# Patient Record
Sex: FEMALE | Race: WHITE
[De-identification: names, ages, dates, MRNs, and addresses within clinical notes are randomized per-mention and may not be internally consistent; named-entity substitution may affect disease eponyms.]

---

## 2018-08-27 ENCOUNTER — HOSPITAL ENCOUNTER (INPATIENT)
Dept: HOSPITAL 36 - GERO2 | Age: 58
LOS: 16 days | Discharge: NURSING, REHAB OR LONG-TERM CARE | DRG: 885 | End: 2018-09-12
Attending: PSYCHIATRY & NEUROLOGY | Admitting: PSYCHIATRY & NEUROLOGY
Payer: MEDICARE

## 2018-08-27 VITALS — DIASTOLIC BLOOD PRESSURE: 72 MMHG | SYSTOLIC BLOOD PRESSURE: 111 MMHG

## 2018-08-27 DIAGNOSIS — R31.29: ICD-10-CM

## 2018-08-27 DIAGNOSIS — E87.6: ICD-10-CM

## 2018-08-27 DIAGNOSIS — Z87.891: ICD-10-CM

## 2018-08-27 DIAGNOSIS — R73.9: ICD-10-CM

## 2018-08-27 DIAGNOSIS — F20.9: Primary | ICD-10-CM

## 2018-08-27 LAB
ALBUMIN SERPL-MCNC: 3.6 GM/DL (ref 3.7–5.3)
ALBUMIN/GLOB SERPL: 0.9 {RATIO} (ref 1–1.8)
ALP SERPL-CCNC: 83 U/L (ref 34–104)
ALT SERPL-CCNC: 11 U/L (ref 7–52)
ANION GAP SERPL CALC-SCNC: 11 MMOL/L (ref 7–16)
AST SERPL-CCNC: 17 U/L (ref 13–39)
BILIRUB SERPL-MCNC: 0.3 MG/DL (ref 0.3–1)
BUN SERPL-MCNC: 29 MG/DL (ref 7–25)
CALCIUM SERPL-MCNC: 9.5 MG/DL (ref 8.6–10.3)
CHLORIDE SERPL-SCNC: 104 MEQ/L (ref 98–107)
CO2 SERPL-SCNC: 25.5 MEQ/L (ref 21–31)
CREAT SERPL-MCNC: 1.4 MG/DL (ref 0.6–1.2)
GLOBULIN SER-MCNC: 3.9 GM/DL
GLUCOSE SERPL-MCNC: 116 MG/DL (ref 70–105)
POTASSIUM SERPL-SCNC: 3.5 MEQ/L (ref 3.5–5.1)
SODIUM SERPL-SCNC: 137 MEQ/L (ref 136–145)

## 2018-08-27 PROCEDURE — Z7610: HCPCS

## 2018-08-27 PROCEDURE — G0410 GRP PSYCH PARTIAL HOSP 45-50: HCPCS

## 2018-08-27 NOTE — PSYCHIATRIC EVALUATION
DATE OF SERVICE:  08/27/2018



The patient is currently in the hospital on a hold written on 08/25/2018 for

danger to others, gravely disabled.  The patient was disorganized, tangential,

yelling and attempting to strike staff, agitated.  Does not know where she is,

states that her son is the  for [employer] and that she is

"traveling" and "wandering around."



CHIEF COMPLAINT:  "What he wants."



HISTORY OF PRESENT ILLNESS:  A 57-year-old female, disoriented, confused,

rambling nonsensically, placed on a hold for grave disability, danger to others,

confused, disoriented, striking out behaviors, combative behaviors.  States she

is in the hospital to "get her meals."  States, she is just passing through,

going from one hospital to the next.  The patient was uncooperative on initial

exam, states that she can refuse medications due to the LPS act.  States that

her son works in [place] for president [employer].



PAST PSYCHIATRIC HISTORY:  Hospitalizations in the past.



SOCIAL HISTORY:  "I do not know where I was born."  States she has a son who

is 35 who works for the president, refusing to tell me where she lives.  Unclear

drugs, alcohol or tobacco.



MEDICATIONS:  Noted.



MEDICAL HISTORY:  Please see full H and P.



MENTAL STATUS EXAMINATION:  Stated age.  Fair eye contact.  Speech:  Mumbling to

self.  Mood "okay."  Affect flat.  Thought processes, she seemed to be

disorganized, responding to internal stimuli.  No SI, no HI.  Insight and

judgment diminished.



PROVISIONAL DIAGNOSES:  Schizophrenia, poor medication and treatment compliance.



MEDICAL:  Please see full H and P.



ESTIMATED LENGTH OF STAY:  7-10 days.  Pain is 8/10.



ASSESSMENT:  The patient is disorganized, psychotic appearing, in on a hold.



PLAN:  Start Risperdal.



TREATMENT PLAN:  Includes group as well as milieu therapy.



CONDITIONS FOR DISCHARGE:  Improved mood, improved affect, better control of her

psychotic symptoms.





DD: 08/27/2018 14:56

DT: 08/27/2018 16:27

JOB# [id number]  [id number]

## 2018-08-27 NOTE — HISTORY & PHYSICAL
ADMIT DATE:  08/27/2018



MEDICAL HISTORY AND PHYSICAL



PATIENT'S IDENTIFICATION:  A 57-year-old female.



REQUESTING PHYSICIAN:  Dr. Lira.



CHIEF COMPLAINT:  "I am fine."



HISTORY OF PRESENT ILLNESS:  A 57-year-old  female presented to [facility] by police for psychiatric evaluation, when the patient was

noted to have a screaming and yelling at staff and police.  The patient was

placed on hold and now has been transferred to Geropsych Unit here at [facility].



PAST MEDICAL HISTORY:  Negative for diabetes, hypertension, hyperlipidemia,

kidney disease, or liver disease.



MEDICATIONS:  Currently medication list has been reviewed.



ALLERGIES:  The patient is not allergic to medications.



SOCIAL HISTORY:  She lives with a roommate.  She has a history of smoking

cigarette.  No alcohol or drug use.



FAMILY MEDICAL HISTORY:  Negative for diabetes, hypertension, kidney disease, or

liver disease.



PAST SURGICAL HISTORY:  The patient refused to say.



MENSTRUAL AND GYNECOLOGIC HISTORY:  The patient is menopausal.



REVIEW OF SYSTEMS:  The patient currently denies any headache, blurred vision,

double vision, dysphagia, odynophagia, runny nose, stuffy nose, fever, chills,

cough, chest pain, shortness of breath, palpitation, dizziness, nausea,

vomiting, diarrhea, dysuria, hematuria, hematochezia, melena.  No history of any

seizure or syncopal episode.  No weight loss or weight gain.



PHYSICAL EXAMINATION:

GENERAL:  The patient is alert, awake, oriented, lying in the bed without any

acute distress.

VITAL SIGNS:  Temperature 98.6, pulse is 74, respiratory rate 18, blood pressure

122/84.

SKIN:  Warm to touch.  Adequate skin turgor.  No petechia, no purpura.

HEENT:  Normocephalic, atraumatic.  Extraocular muscles are intact.  Tongue more

pink and coated.  Poor dentition noted.  No oral lesion.  No bleed, no sinus

tenderness.  External auditory canal and tympanic membranes are well visualized.

NECK:  Supple, no JVD, no hepatojugular reflex.  No lymphadenopathy, thyromegaly

or carotid bruit.

HEART:  Both heart sounds are regular.  No S3, no S4, no murmur.

CHEST AND LUNGS:  Equal in expansion, no expiratory wheezing.

ABDOMEN:  Soft.  No guarding, no rigidity.  Liver, spleen palpable.  No palpable

mass.

EXTREMITIES:  No edema, no cyanosis or clubbing.  Peripheral pulses +2.  No calf

tenderness noted.

EXTERNAL GENITALIA, RECTAL EXAMINATION, BREASTS EXAMINATION:  Not done at the

patient request.

NEUROLOGIC:  Alert, awake, oriented to time, place, person.  2-12 cranial nerves

are intact.  Power in upper or lower extremities is 5+.  Sensation to touch are

intact.  Babinskis in both toes are going down.  No cerebellar sign.



AVAILABLE DIAGNOSTIC DATA:  White count of 9.2, hemoglobin 14.7, platelet count

of 228.  Sodium 132, potassium 3.2, chloride 102, CO2 of 29, BUN and creatinine

is 8 and 0.7.  Liver functions are normal.  Urine drug screen was negative. 

Urinalysis was unremarkable.



CLINICAL IMPRESSION:

1.  Psychiatric disorder exacerbation.

2.  Hypokalemia.

3.  Microscopic hematuria.



PLAN:

1.  Recheck BMP.

2.  Recheck urinalysis.

3.  Psychiatric evaluation and management deferred to psychiatrist.

4.  The patient is medically stable to participate in active Geropsych Unit.

5.  The patient has been advised to see her primary care MD upon discharge for

followup.

6.  Care approved.

7.  Age appropriate health maintenance has been discussed.

8.  I sincerely thank you, Dr. Lira, for giving me the opportunity to

participate the patient of yours.





DD: 08/27/2018 09:43

DT: 08/27/2018 10:39

[facility]# [id number]  [id number]

## 2018-08-28 NOTE — PROGRESS NOTES
DATE:  



IDENTIFICATION:  A 57-year-old female.



SUBJECTIVE:  The patient seen and examined.  The patient is walking in the

hallway.  The patient remained agitated and confused.  Upon questioning, the

patient denies any chest pain, short of breath, palpitation, dizziness, nausea,

or vomiting.



OBJECTIVE:

VITAL SIGNS:  Temperature 97.7, pulse 80, respiratory rate 20, and blood

pressure 100/56.

HEENT:  No facial asymmetry.

NECK:  Supple, no JVD.

HEART:  Regular.

CHEST:  Lung equal in expansion, no expiratory wheezing.

ABDOMEN:  Soft.  No guarding, no rigidity.  Bowel sounds present.  No palpable

mass.

EXTREMITIES:  No edema.



AVAILABLE DIAGNOSTIC DATA:  BUN and creatinine are 29 and 1.4, potassium 3.5,

glucose 116, albumin 3.6.



CLINICAL IMPRESSION:

1.  Elevated BUN and creatinine, probably prerenal azotemia.  Cannot rule out

other etiology of chronic kidney disease.

2.  Mild hyperglycemia.

3.  Psychotic disorder.



PLAN:

1.  Encouraged to drink plenty of fluids orally.

2.  Recheck BMP, magnesium, and also check glycohemoglobin A1c.

3.  Psych medication and follow up as per psychiatrist.  Care plan reviewed.





DD: 08/28/2018 09:02

DT: 08/28/2018 09:37

JOB# [id number]  [id number]

## 2018-08-29 NOTE — PROGRESS NOTES
DATE:  08/28/2018



SUBJECTIVE:  The patient stuffing linens down the toilet, bizarre, symptomatic,

psychotic behaviors, talking about Satan, mumbling to self, very disheveled,

disorganized, psychotic appearing, gravely disabled, not making any sense, keeps

mentioning the LPS act.



ASSESSMENT:  The patient is bizarre, stuffing linens down the toilet.  The

patient requiring emergency medication x 1 now, Haldol cocktail.  



PLAN:  We will continue to monitor and initiate a 14-day hold.





DD: 08/28/2018 20:50

DT: 08/29/2018 01:21

JOB# [id number]  [id number]

## 2018-08-29 NOTE — PROGRESS NOTES
DATE:  08/29/2018



SUBJECTIVE:  The patient has been stuffing linens down the toilet.  States that

she does not want to talk to me because "I am LPS," refusing medications

because of "LPS."  Noted to be gravely disabled, bizarre.  States she lives at

an undisclosed location.  Medications were reviewed.  Poor compliance.  Staff

concerned about safety and she is pretty disorganized, disheveled, and unkempt. 

Stating that she is a creator.



ASSESSMENT:  The patient is bizarre, refusing medication.  Has a tendency to

stop the paper towels and tissues down the toilet.



PLAN:  We will continue to monitor given her ongoing symptoms, she is not safe

for discharge.  I will likely need to file a Riese petition.





DD: 08/29/2018 12:36

DT: 08/29/2018 18:31

JOB# [id number]  [id number]

## 2018-08-30 NOTE — PROGRESS NOTES
DATE:  08/30/2018



SUBJECTIVE:  The patient states that she is not a mental health patient.  When I

asked her why she is here, she refers to the penal code and gives me the badge

number of an officer in [place], very paranoid, bizarre, mood labile, very

suspicious.  "I have the right to refuse medications." "Have you ever heard

of the LPS act."  The patient stuffing linens down the toilet stating that she

is a  and referring to mental health acts.  Gravely disabled.  Poorly

oriented.



ASSESSMENT:  The patient is psychotic, bizarre, unable to care for her basic

needs, bizarre behaviors, refusing treatments.



PLAN:  We will continue to monitor.  I did file a Riese petition.





DD: 08/30/2018 07:17

DT: 08/30/2018 17:51

JOB# [id number]  [id number]

## 2018-08-31 NOTE — PROGRESS NOTES
DATE:  08/31/2018



The patient stating that she does not take medications that she is on a mental

health patient, still disorganized, bizarre, mumbling to self, talking about

Satan and the devil, hyper-Anabaptism concerns that she will continue to stop

linens down the toilet.  The patient disorganized, making nonsensical statements

clearly and grossly psychotic.



ASSESSMENT AND PLAN:  The patient unpredictable, paranoid, repetitive,

disorganized.



PLAN:  Risperdal held.  Start Haldol.





DD: 08/31/2018 09:50

DT: 08/31/2018 21:51

JOB# [id number]  [id number]

## 2018-09-01 NOTE — PROGRESS NOTES
DATE:  09/01/2018



DATE OF SERVICE:  09/01/2018



SUBJECTIVE:  The patient seen and examined.  The patient is ambulatory.  The

patient is alert, confused, and delusional at times.  The patient has a fair

appetite.  The patient denies any _____, unable to get a meaningful history.



OBJECTIVE:  

VITAL SINGS:  Temperature 98.3, pulse 78, respiratory rate is 20, blood pressure

120/70.

HEENT:  No facial asymmetry.

NECK:  Supple, no JVD.

HEART:  Regular.

CHEST AND LUNGS:  Equal in expansion, no expiratory wheezing.

ABDOMEN:  Soft.  No guarding, no rigidity.  Bowel sounds are present.  No

palpable mass.

EXTREMITIES:  No edema, no cyanosis.

NEUROLOGIC:  Alert, awake.  No gross neuro deficit noted.



CLINICAL IMPRESSION:

1.  Psychotic disorder exacerbation, currently receiving psych care.

2.  History of hypokalemia and microscopic hematuria.



PLAN:  The patient did have elevated creatinine of 1.4.  We will reevaluate BMP

and urinalysis and will give further recommendations.  Care plan reviewed and

discussed.





DD: 09/01/2018 12:33

DT: 09/01/2018 19:29

JOB# [id number]  [id number]

## 2018-09-01 NOTE — PROGRESS NOTES
DATE:  09/01/2018



DATE OF SERVICE:  09/01/2018



SUBJECTIVE:  The patient is currently in the hospital, psychotic, flushing

linens down the toilet, hyper-Adventism, bizarre, very paranoid, unruly

behaviors.  The patient required emergency medications yesterday for agitation,

psychotic agitation.  The patient on face just repeating "get down behind me",

"get down behind me", "get down behind me" over and over again, then calling

these clinicians an idiots, telling me, I have no wife, no friends, no life, no

family, very angry, does not want take the medications.  Riese was upheld.  On a

positive note, she slept well last night.



ASSESSMENT:  The patient is disheveled, unkempt, psychotic appearing, ruminative

clearly, and overtly responding to internal stimuli.



PLAN:  We will continue to monitor.  Continue Haldol.  We will order in the next

few days Haldol decanoate.





DD: 09/01/2018 06:32

DT: 09/01/2018 08:36

JOB# [id number]  [id number]

## 2018-09-03 NOTE — PROGRESS NOTES
DATE:  09/02/2018



SUBJECTIVE:  The patient in the hospital, disorganized, paranoid, delusional. 

The patient was talking about the devil yesterday, calmer today, "doctor I hope

you have a good day", isolative, withdrawn, still mumbling to self, talking to

self, observed to be mumbling to self, touching unseen objects on the floor. 

The patient states her name is Lili not Reyna _____ as Reyna.  The patient is

believing there is a cat on the floor.



ASSESSMENT:  The patient is bizarre, delusional, psychotic.



PLAN:  We will continue to monitor.  Initiate Haldol decanoate today.  Given her

ongoing symptoms, she is quite bizarre and psychotic, unable to be cared for at

a lower level of care due to her gross psychotic symptoms.





DD: 09/02/2018 06:55

DT: 09/03/2018 02:44

JOB# [id number]  [id number]

## 2018-09-03 NOTE — PROGRESS NOTES
DATE:  09/03/2018



SUBJECTIVE:  The patient is calmer, states "you look very nice today doctor". 

The patient is still talking about the devil, noting that the voices do not talk

to her too much, "The devil does not talk to me too much."  The patient

getting Haldol Decanoate.  Still does not know where she lives, still touching

unseen objects, psychotic appearing, but significantly calmer, seems less

distressed by perceptual disturbances, more open to giving me information about

where she lives, noting that she had been staying in [place], currently now

in [place] for some unclear reason.



ASSESSMENT:  The patient internally preoccupied, disorganized, poor historian,

but significantly calmer, more amenable to treatment.  Medications were noted. 

No EPS  Continue Haldol.





DD: 09/03/2018 10:43

DT: 09/03/2018 14:59

JOB# [id number]  [id number]

## 2018-09-04 NOTE — PROGRESS NOTES
DATE:  09/04/2018



SUBJECTIVE:  The patient in the hospital, noted to be somewhat brighter, asking

to leave, states she has a home to go to, but wants to be homeless discharge. 

States she is on vacation in [place], wandering in the streets, bizarre,

poorly oriented, preoccupied, mumbling to self and guarded about her address

"that is personal."  The patient remains impulsive, unpredictable, given her

ongoing psychotic symptoms.  The patient did get Haldol Decanoate seems to be

showing signs of improvement.



ASSESSMENT:  The patient is paranoid, still bizarre, odd.



Medications were noted.  We will continue to monitor, titrate medications,

adjust medications.





DD: 09/04/2018 10:43

DT: 09/04/2018 11:20

[facility]# [id number]  [id number]

## 2018-09-05 NOTE — PROGRESS NOTES
DATE:  09/05/2018



SUBJECTIVE:  The patient with ongoing psychotic symptoms, talking about the

 in [place] bringing her here, nonsensical, ruminative, paranoid,

suspicious, wandering behaviors, impulsive, unpredictable, gravely disabled.



ASSESSMENT:  The patient remains symptomatic, paranoid, bizarre.



PLAN:  We will continue to monitor.  The patient will likely benefit from dose

titration of Haldol.  We will increase her dosing from 5-7 mg twice daily.





DD: 09/05/2018 12:23

DT: 09/05/2018 16:32

[facility]# [id number]  [id number]

## 2018-09-06 RX ADMIN — HALOPERIDOL SCH MG: 5 TABLET ORAL at 16:25

## 2018-09-06 NOTE — PROGRESS NOTES
DATE:  09/06/2018



The patient mumbling to self, delusional, bizarre, _____ that she has been

attacked by multiple staff members, responding to internal stimuli, asking for

the LPS book, highly delusional, seems to be responding heavily to internal

stimuli, disheveled, unkempt.



ASSESSMENT:  The patient remains symptomatic, bizarre, gravely disabled, grossly

psychotic.



PLAN:  We will continue to monitor.  We will increase her Haldol dosing today.





DD: 09/06/2018 06:45

DT: 09/06/2018 10:34

JOB# [id number]  [id number]

## 2018-09-07 LAB
CHOLEST SERPL-MCNC: 172 MG/DL (ref ?–200)
HDLC SERPL-MCNC: 51 MG/DL (ref 23–92)
TRIGL SERPL-MCNC: 98 MG/DL (ref ?–150)

## 2018-09-07 RX ADMIN — HALOPERIDOL SCH MG: 5 TABLET ORAL at 08:51

## 2018-09-07 NOTE — PROGRESS NOTES
DATE:  09/07/2018



SUBJECTIVE:  The patient remains symptomatic, psychotic, trying to flood the

toilet, acting bizarre, ongoing concerns about her psychosis.  States that she

plans to walk Ganesh, going into other patients' rooms, acting strange,

disoriented.



ASSESSMENT:  The patient bizarre, flooding the toilet, requiring a lot of staff

intervention.



PLAN:  We will continue to monitor, increase dosing of Haldol today.  We will

initiate Depakote.  We will give another shot of Haldol decanoate.





DD: 09/07/2018 11:15

DT: 09/07/2018 14:42

JOB# [id number]  [id number]

## 2018-09-08 NOTE — PROGRESS NOTES
DATE:  09/08/2018



Covering for Dr. Lira.



IDENTIFING DATA:  This is a 57-year-old female who presented with psychotic and

disorganized.  Today on face-to-face evaluation, the patient continues to

perseverate on 2 phrases fornication and blood when attempting to discuss with

her what she means by that.  She perseverates about fornication and blood with

intense stare and then goes back to her room.



MENTAL STATUS EXAMINATION:  Bizarre, psychotic, delusional, ruminating about

random psychotic symptoms.



ASSESSMENT AND PLAN:  Reyna Zabala with history of schizophrenia.  We will

continue monitor and evaluate a lot, especially with the recent increase of the

Haldol, which was increased yesterday and Depakote was initiated depending on

Haldol taken.





DD: 09/08/2018 10:56

DT: 09/08/2018 21:32

JOB# [id number]  [id number]

## 2018-09-09 NOTE — PROGRESS NOTES
DATE:  09/09/2018



The patient was seen and evaluated.  The patient's chart reviewed.



Dr. Macdonald covering for Dr. Lira.  



Today, nursing staff reported the patient refused her medications, especially

the Depakote.  Today on face-to-face evaluation, the patient _____ and then

starts screaming at the psychiatrist saying that she needs to be released.  When

attempting to redirect her emotions, she starts derailing and making

disorganized thought process.



MENTAL STATUS EXAMINATION:  Disorganized, bizarre, delusional, ruminating random

psychotic thoughts.



ASSESSMENT AND PLAN:  Reyna Zabala with a history of schizophrenia continues to

be psychotic and disorganized.  We will continue with the current medication

regimen, which includes Cogentin 1 mg p.o. b.i.d., Depakote 500 mg p.o. b.i.d.,

haloperidol 10 mg b.i.d., and _____ of the Haldol Decanoate, which was given on

the 09/02/2018.  No side effects of medications.  No EPS.  No tardive

dyskinesia.





DD: 09/09/2018 07:42

DT: 09/09/2018 17:35

JOB# [id number]  [id number]

## 2018-09-10 NOTE — PROGRESS NOTES
DATE:  09/10/2018



SUBJECTIVE:  The patient currently still very upset, bizarre, stating that she

saw this clinician yesterday in a movie.  She has no place to go, demanding bus

tokens, still responding to internal stimuli, gets agitated at times. 

Currently, concerns about grave disability, paranoia, disorganized,

unpredictable, talking to herself, mostly keeps to herself in her room.



ASSESSMENT:  The patient rambling about different topics.  "For indications"

stating things about "blood."  Needs prompting for ADLs.  No overt unclear

plan for discharge.



PLAN:  We will continue to monitor, continue Depakote, Haldol with ____.





DD: 09/10/2018 14:30

DT: 09/10/2018 19:41

[facility]# [id number]  [id number]

## 2018-09-11 NOTE — PROGRESS NOTES
DATE:  09/11/2018



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  The patient is

bizarre, nonsensical, making odd statements.  Currently on high doses of Haldol.

 The patient may be approaching her baseline.  She is pretty grandiose, stating

that she is on vacation. Better ADLs.  She is less distracted and mildly more

organized, but still making some bizarre statements.  Medications were noted.



PLAN:  I will continue to monitor.  We will check a Depakote level.  Titrate

dosing as needed.





DD: 09/11/2018 11:49

DT: 09/11/2018 15:17

JOB# [id number]  [id number]

## 2018-09-12 NOTE — DISCHARGE SUMMARY
DATE OF DISCHARGE:  09/12/2018



JUSTIFICATION FOR HOSPITALIZATION:  Disorganized, tangential, yelling, bizarre.



HISTORY OF PRESENT ILLNESS:  A 57-year-old female, disoriented, confused,

rambling, talking about the devil, stating she is on vacation, talking about the

LPS rules stating that she works for a company in [place] giving me a badge

number, bizarre, stating she works for president [employer].



PAST PSYCHIATRIC HISTORY:  She lives hospitalizations in the past.



SOCIAL HISTORY:  It seems she may be homeless.



MEDICATIONS:  Noted.



FAMILY HISTORY:  Unclear.



PAST MEDICAL HISTORY:  Please see full H and P.



MENTAL STATUS EXAMINATION:  Please see full psych eval for details.



PROVISIONAL DIAGNOSES:  Schizophrenia, poor medication and treatment compliance.

 Under medical, please see full H and P.



HOSPITAL COURSE:  After initial assessment, the patient was placed on

medications including Risperdal, which she refused.  The Riese petition was

filed on a 14-day hold, it was upheld, suicide on Haldol and Haldol Decanoate. 

Haldol was titrated.  Over the course of the hospitalization, the patient was

calmer, more med compliant.  She remained somewhat delusional about working for

a company in [place].  She remained delusional while being on vacation and

having homes.  However, she was not agitated.  She was not combative.  She was

not violent.  She was not aggressive.  She was no longer trying for example

overflow the toilet.  She was not aggressive.  No dangerousness noted.  Sleeping

well, eating well, taking her medications.  She was also agreeable to placement

on 09/12/2018.  Placement was confirmed and she was discharged, eating on her

own volition, bath remains on her own volition, appropriately dressed and

groomed.



CONDITION UPON DISCHARGE:  Improved.  Fair ADLs.  Poor dentition. 

Psychomotorically normal.  Mood "fine" ____.  Affect constricted.  Thought

processes were tangential.  No SI, no HI.  No overt auditory or visual

hallucinations.  She did remain somewhat delusional about owning homes and being

on vacation and working for a company, but this did not manifest to any

dangerous behaviors, impulse control better, insight better, judgment better. 

She is taking medications.



DISCHARGE DIAGNOSIS:  Schizophrenia.  Under medical, please see full H and P.



PROGNOSIS:  The patient follows up with outpatient mental health services and

remains compliant with her treatment.  Prognosis will improve, otherwise

guarded.





DD: 09/12/2018 09:45

DT: 09/12/2018 11:30

[facility]# [id number]  [id number]

## 2019-03-27 ENCOUNTER — HOSPITAL ENCOUNTER (INPATIENT)
Dept: HOSPITAL 54 - ER | Age: 59
LOS: 13 days | Discharge: NURSING, REHAB OR LONG-TERM CARE | DRG: 881 | End: 2019-04-09
Attending: NURSE PRACTITIONER | Admitting: PSYCHIATRY & NEUROLOGY
Payer: MEDICARE

## 2019-03-27 VITALS — BODY MASS INDEX: 21.97 KG/M2 | WEIGHT: 140 LBS | HEIGHT: 67 IN

## 2019-03-27 VITALS — DIASTOLIC BLOOD PRESSURE: 72 MMHG | SYSTOLIC BLOOD PRESSURE: 123 MMHG

## 2019-03-27 DIAGNOSIS — B96.89: ICD-10-CM

## 2019-03-27 DIAGNOSIS — E87.6: ICD-10-CM

## 2019-03-27 DIAGNOSIS — Z79.899: ICD-10-CM

## 2019-03-27 DIAGNOSIS — N39.0: ICD-10-CM

## 2019-03-27 DIAGNOSIS — Z73.6: ICD-10-CM

## 2019-03-27 DIAGNOSIS — Z59.0: ICD-10-CM

## 2019-03-27 DIAGNOSIS — Z79.51: ICD-10-CM

## 2019-03-27 DIAGNOSIS — J45.909: ICD-10-CM

## 2019-03-27 DIAGNOSIS — E44.0: ICD-10-CM

## 2019-03-27 DIAGNOSIS — F20.9: ICD-10-CM

## 2019-03-27 DIAGNOSIS — J44.9: ICD-10-CM

## 2019-03-27 DIAGNOSIS — F32.9: Primary | ICD-10-CM

## 2019-03-27 LAB
ALBUMIN SERPL BCP-MCNC: 3.1 G/DL (ref 3.4–5)
ALP SERPL-CCNC: 49 U/L (ref 46–116)
ALT SERPL W P-5'-P-CCNC: 20 U/L (ref 12–78)
APAP SERPL-MCNC: < 5 UG/ML (ref 10–30)
AST SERPL W P-5'-P-CCNC: 21 U/L (ref 15–37)
BASOPHILS # BLD AUTO: 0.1 /CMM (ref 0–0.2)
BASOPHILS NFR BLD AUTO: 1.1 % (ref 0–2)
BILIRUB DIRECT SERPL-MCNC: 0.1 MG/DL (ref 0–0.2)
BILIRUB SERPL-MCNC: 0.2 MG/DL (ref 0.2–1)
BILIRUB UR QL STRIP: (no result)
BUN SERPL-MCNC: 9 MG/DL (ref 7–18)
CALCIUM SERPL-MCNC: 8.6 MG/DL (ref 8.5–10.1)
CHLORIDE SERPL-SCNC: 103 MMOL/L (ref 98–107)
CO2 SERPL-SCNC: 32 MMOL/L (ref 21–32)
CREAT SERPL-MCNC: 0.6 MG/DL (ref 0.6–1.3)
EOSINOPHIL NFR BLD AUTO: 1.6 % (ref 0–6)
ETHANOL SERPL-MCNC: < 3 MG/DL (ref 0–0)
GLUCOSE SERPL-MCNC: 90 MG/DL (ref 74–106)
HCT VFR BLD AUTO: 41 % (ref 33–45)
HGB BLD-MCNC: 14.1 G/DL (ref 11.5–14.8)
KETONES UR STRIP-MCNC: 40 MG/DL
LYMPHOCYTES NFR BLD AUTO: 2.7 /CMM (ref 0.8–4.8)
LYMPHOCYTES NFR BLD AUTO: 35.4 % (ref 20–44)
MCHC RBC AUTO-ENTMCNC: 34 G/DL (ref 31–36)
MCV RBC AUTO: 90 FL (ref 82–100)
MONOCYTES NFR BLD AUTO: 0.9 /CMM (ref 0.1–1.3)
MONOCYTES NFR BLD AUTO: 11.2 % (ref 2–12)
NEUTROPHILS # BLD AUTO: 3.8 /CMM (ref 1.8–8.9)
NEUTROPHILS NFR BLD AUTO: 50.7 % (ref 43–81)
PH UR STRIP: 7 [PH] (ref 5–8)
PLATELET # BLD AUTO: 210 /CMM (ref 150–450)
POTASSIUM SERPL-SCNC: 2.8 MMOL/L (ref 3.5–5.1)
PROT SERPL-MCNC: 6.1 G/DL (ref 6.4–8.2)
PROT UR QL STRIP: 30 MG/DL
RBC # BLD AUTO: 4.59 MIL/UL (ref 4–5.2)
RBC #/AREA URNS HPF: (no result) /HPF (ref 0–2)
SALICYLATES SERPL-MCNC: 3.8 MG/DL (ref 2.8–20)
SODIUM SERPL-SCNC: 141 MMOL/L (ref 136–145)
UROBILINOGEN UR STRIP-MCNC: 1 EU/DL
WBC #/AREA URNS HPF: (no result) /HPF (ref 0–3)
WBC NRBC COR # BLD AUTO: 7.6 K/UL (ref 4.3–11)

## 2019-03-27 PROCEDURE — G0480 DRUG TEST DEF 1-7 CLASSES: HCPCS

## 2019-03-27 SDOH — ECONOMIC STABILITY - HOUSING INSECURITY: HOMELESSNESS: Z59.0

## 2019-03-27 NOTE — NUR
GPS/RN ADMISSION NOTES: ADMITTED FROM [facility], ON 5150 HOLD, INITIALLY CAME FROM [facility]. PATIENT IS HOMELESS. CAME TO THE UNIT AROUND 2045 ACCOMPANIED BY ER STAFF VIA [employer]. 
PER HOLD PATIENT STOLE FRUIT, NO CHARGES PRESSED. SHE IS DISORGANIZED AND DISORIENTED. 
PATIENT HAS NO MEANS TO PROVIDE FOOD, SHELTER OR CARE FOR HERSELF, UNABLE TO IDENTIFY PLAN 
FOR SAFETY, SHE WAS GRANDIOSE AND DELUSIONAL, STATING " I AM THE CREATOR." PLACED PATIENT IN 
BED, SHOWS NO S/S OF ANY DISTRESS/PAIN. RESPIRATION EVEN, BREATHING PATTERN NON-LABORED, NO 
APPARENT DISTRESS NOTED. PATIENT VERY UNCOOPERATIVE, CONFUSED, DISORIENTED, DISORGANIZED, 
UNKEMPT, DISHEVELED, SELECTIVELY MUTE, POOR HYGIENE, ABLE TO AMBULATE WITH ASSISTANCE DUE TO 
UNSTABLE GAIT. CONTINENT, REFUSED TO SIGN CONSENT, REFUSED TOTAL BODY ASSESSMENT, REFUSED 
PHOTO TAKEN, REFUSED FLU AND PNEUMONIA VACCINE. OFFERED FOOD BUT REFUSED. NO FAMILY/RELATIVE 
GIVEN TO NOTIFY ABOUT ADMISSION TO THE UNIT. MED RECON- NEEDS FOLLOW-UP IN AM. PATIENT HAS 1 
MEDICINE/INHALER.

BELONGINGS INVENTORIED AND CHECKED FOR CONTRABAND. BED LOCKED AND PLACED ON LOWER POSITION 
TO PREVENT FALL/INJURY. WILL CONTINUE TO MONITOR Q 15 MINS. FOR SAFETY AND BEHAVIOR.

## 2019-03-27 NOTE — NUR
OLAMIDE FROM [facility] FOR PSYCHEVAL INCREASE HALLUCINATION, -SI/HI, "I AM THE 
CREATOR", TO ER BED 13, HOOKED TO MONITOR, CHANGED TO SALUD, AWAITING MD MOREIRA.

## 2019-03-28 VITALS — DIASTOLIC BLOOD PRESSURE: 66 MMHG | SYSTOLIC BLOOD PRESSURE: 104 MMHG

## 2019-03-28 VITALS — SYSTOLIC BLOOD PRESSURE: 95 MMHG | DIASTOLIC BLOOD PRESSURE: 58 MMHG

## 2019-03-28 VITALS — SYSTOLIC BLOOD PRESSURE: 110 MMHG | DIASTOLIC BLOOD PRESSURE: 65 MMHG

## 2019-03-28 RX ADMIN — Medication SCH MG: at 17:31

## 2019-03-28 RX ADMIN — Medication SCH MG: at 09:10

## 2019-03-28 RX ADMIN — Medication SCH MG: at 12:43

## 2019-03-28 RX ADMIN — HALOPERIDOL SCH MG: 5 TABLET ORAL at 21:51

## 2019-03-28 RX ADMIN — BENZTROPINE MESYLATE SCH MG: 1 TABLET ORAL at 21:51

## 2019-03-28 NOTE — NUR
INITIAL DISCHARGE PLAN: Patient is homeless and will need SNF placement. SW will help form a 
safe and proper discharge in collaboration with MD.

## 2019-03-29 VITALS — DIASTOLIC BLOOD PRESSURE: 50 MMHG | SYSTOLIC BLOOD PRESSURE: 95 MMHG

## 2019-03-29 VITALS — SYSTOLIC BLOOD PRESSURE: 100 MMHG | DIASTOLIC BLOOD PRESSURE: 59 MMHG

## 2019-03-29 VITALS — DIASTOLIC BLOOD PRESSURE: 56 MMHG | SYSTOLIC BLOOD PRESSURE: 107 MMHG

## 2019-03-29 LAB
ALBUMIN SERPL BCP-MCNC: 3.2 G/DL (ref 3.4–5)
ALP SERPL-CCNC: 41 U/L (ref 46–116)
ALT SERPL W P-5'-P-CCNC: 19 U/L (ref 12–78)
AST SERPL W P-5'-P-CCNC: 18 U/L (ref 15–37)
BASOPHILS # BLD AUTO: 0.1 /CMM (ref 0–0.2)
BASOPHILS NFR BLD AUTO: 1.2 % (ref 0–2)
BILIRUB SERPL-MCNC: 0.3 MG/DL (ref 0.2–1)
BUN SERPL-MCNC: 6 MG/DL (ref 7–18)
CALCIUM SERPL-MCNC: 8.9 MG/DL (ref 8.5–10.1)
CHLORIDE SERPL-SCNC: 103 MMOL/L (ref 98–107)
CHOLEST SERPL-MCNC: 167 MG/DL (ref ?–200)
CO2 SERPL-SCNC: 27 MMOL/L (ref 21–32)
CREAT SERPL-MCNC: 0.6 MG/DL (ref 0.6–1.3)
EOSINOPHIL NFR BLD AUTO: 1.1 % (ref 0–6)
GLUCOSE SERPL-MCNC: 105 MG/DL (ref 74–106)
HCT VFR BLD AUTO: 44 % (ref 33–45)
HDLC SERPL-MCNC: 66 MG/DL (ref 40–60)
HGB BLD-MCNC: 15 G/DL (ref 11.5–14.8)
LDLC SERPL DIRECT ASSAY-MCNC: 84 MG/DL (ref 0–99)
LYMPHOCYTES NFR BLD AUTO: 1.5 /CMM (ref 0.8–4.8)
LYMPHOCYTES NFR BLD AUTO: 31 % (ref 20–44)
MCHC RBC AUTO-ENTMCNC: 34 G/DL (ref 31–36)
MCV RBC AUTO: 89 FL (ref 82–100)
MONOCYTES NFR BLD AUTO: 0.5 /CMM (ref 0.1–1.3)
MONOCYTES NFR BLD AUTO: 10.3 % (ref 2–12)
NEUTROPHILS # BLD AUTO: 2.7 /CMM (ref 1.8–8.9)
NEUTROPHILS NFR BLD AUTO: 56.4 % (ref 43–81)
PLATELET # BLD AUTO: 191 /CMM (ref 150–450)
POTASSIUM SERPL-SCNC: 3.9 MMOL/L (ref 3.5–5.1)
PROT SERPL-MCNC: 6.4 G/DL (ref 6.4–8.2)
RBC # BLD AUTO: 4.96 MIL/UL (ref 4–5.2)
SODIUM SERPL-SCNC: 138 MMOL/L (ref 136–145)
TRIGL SERPL-MCNC: 106 MG/DL (ref 30–150)
WBC NRBC COR # BLD AUTO: 4.7 K/UL (ref 4.3–11)

## 2019-03-29 RX ADMIN — BENZTROPINE MESYLATE SCH MG: 1 TABLET ORAL at 09:17

## 2019-03-29 RX ADMIN — Medication SCH MG: at 09:17

## 2019-03-29 RX ADMIN — BENZTROPINE MESYLATE SCH MG: 1 TABLET ORAL at 20:34

## 2019-03-29 RX ADMIN — Medication SCH MG: at 12:40

## 2019-03-29 RX ADMIN — HALOPERIDOL SCH MG: 5 TABLET ORAL at 09:17

## 2019-03-29 RX ADMIN — HALOPERIDOL SCH MG: 5 TABLET ORAL at 20:34

## 2019-03-29 RX ADMIN — Medication SCH MG: at 17:05

## 2019-03-29 NOTE — NUR
GPS/RN-NOTES

NOTED PATIENT SITTING ON THE FLOOR IN THE ROOM. STATED" I DON'T LIKE SNAKE, I DON'T LIKE 
SNAKE VENOM". REASSURED  AND REORIENTED PATIENT. ENCOURAGE PATIENT TO ATTEND [employer] [employer] 
AND VERBALIZE FELLING TO THE STAFF,BUT PATIENT PREFERS TO STAY IN THE ROOM.WILL CONT. 
MONITORING Q15 MINS. FOR SAFETY AND BEHAVIOR.

## 2019-03-30 VITALS — SYSTOLIC BLOOD PRESSURE: 98 MMHG | DIASTOLIC BLOOD PRESSURE: 60 MMHG

## 2019-03-30 VITALS — DIASTOLIC BLOOD PRESSURE: 62 MMHG | SYSTOLIC BLOOD PRESSURE: 100 MMHG

## 2019-03-30 VITALS — DIASTOLIC BLOOD PRESSURE: 63 MMHG | SYSTOLIC BLOOD PRESSURE: 103 MMHG

## 2019-03-30 RX ADMIN — BENZTROPINE MESYLATE SCH MG: 1 TABLET ORAL at 21:33

## 2019-03-30 RX ADMIN — Medication SCH MG: at 12:32

## 2019-03-30 RX ADMIN — BENZTROPINE MESYLATE SCH MG: 1 TABLET ORAL at 08:19

## 2019-03-30 RX ADMIN — Medication SCH MG: at 16:24

## 2019-03-30 RX ADMIN — HALOPERIDOL SCH MG: 5 TABLET ORAL at 08:19

## 2019-03-30 RX ADMIN — Medication SCH MG: at 08:19

## 2019-03-30 RX ADMIN — HALOPERIDOL SCH MG: 5 TABLET ORAL at 21:33

## 2019-03-31 VITALS — SYSTOLIC BLOOD PRESSURE: 114 MMHG | DIASTOLIC BLOOD PRESSURE: 67 MMHG

## 2019-03-31 VITALS — DIASTOLIC BLOOD PRESSURE: 76 MMHG | SYSTOLIC BLOOD PRESSURE: 111 MMHG

## 2019-03-31 VITALS — DIASTOLIC BLOOD PRESSURE: 65 MMHG | SYSTOLIC BLOOD PRESSURE: 108 MMHG

## 2019-03-31 RX ADMIN — BENZTROPINE MESYLATE SCH MG: 1 TABLET ORAL at 08:21

## 2019-03-31 RX ADMIN — Medication SCH MG: at 08:21

## 2019-03-31 RX ADMIN — ACETAMINOPHEN PRN MG: 325 TABLET ORAL at 15:59

## 2019-03-31 RX ADMIN — BENZTROPINE MESYLATE SCH MG: 1 TABLET ORAL at 21:00

## 2019-03-31 RX ADMIN — Medication SCH MG: at 12:18

## 2019-03-31 RX ADMIN — HALOPERIDOL SCH MG: 5 TABLET ORAL at 08:21

## 2019-03-31 RX ADMIN — HALOPERIDOL SCH MG: 5 TABLET ORAL at 21:00

## 2019-03-31 RX ADMIN — Medication SCH MG: at 16:00

## 2019-03-31 NOTE — NUR
GPS RN NOTE, RECEIVED PATIENT AWAKE AND IN ROOM NO S/S OR COMPLAINTS OF PAIN AT THIS TIME.  
PATIENT IS DISPLAYING NO S/S OF APPARENT DISTRESS AT THIS TIME.  PATIENT BREATHING IS 
UNLABORED WITH EQUAL RISE AND FALL OF THE CHEST.  PATIENT IS ALERT AND ORIENTED X 1 ON ROOM 
AIR WITH A SPO2 OF 97%.  PATIENT IS MED COMPLIANT, DISORGANIZED, ANXIOUS, LABILE,  
COOPERATIVE, AND NEEDS REORIENTATION.  PATIENT DENIES SUICIDE AND HOMICIDAL IDEATIONS AT 
THIS TIME.  PATIENT ASSISTED WITH TURNING AND REPOSITIONING Q2HR AND PRN FOR COMFORT AND 
CIRCULATION.  PATIENT HAS NO NEEDS AT THIS TIME.  PATIENT EDUCATED ON THE USE OF THE CALL 
BELL.  PATIENT BED SIDE RAILS ARE UP X 2 FOR SAFETY, BED IS LOCKED AND LOW.  WILL CONTINUE 
TO MONITOR AND MAINTAIN SAFETY Q15 MIN WITH THE HELP OF STAFF.

## 2019-03-31 NOTE — NUR
GPS RN NOTE, PATIENT REFUSED COGENTIN 1 MG PO Q12HR, AND REFUSED HALDOL 5 MG PO Q12HR.  
OFFERED COGENTIN AND HALDOL THREE TIMES AND STILL PATIENT REFUSED STATING, " LEAVE ME ALONE 
AND GET OUT ".  EDUCATED THIS PATIENT ON THE RISKS AND BENEFITS OF TAKING AND REFUSING 
AFOREMENTIONED MEDICATION.  WILL CONTINUE MONITOR THIS PATIENT.

## 2019-04-01 VITALS — SYSTOLIC BLOOD PRESSURE: 100 MMHG | DIASTOLIC BLOOD PRESSURE: 58 MMHG

## 2019-04-01 VITALS — SYSTOLIC BLOOD PRESSURE: 186 MMHG | DIASTOLIC BLOOD PRESSURE: 61 MMHG

## 2019-04-01 VITALS — DIASTOLIC BLOOD PRESSURE: 61 MMHG | SYSTOLIC BLOOD PRESSURE: 137 MMHG

## 2019-04-01 RX ADMIN — Medication SCH MG: at 16:51

## 2019-04-01 RX ADMIN — Medication SCH MG: at 08:45

## 2019-04-01 RX ADMIN — BENZTROPINE MESYLATE SCH MG: 1 TABLET ORAL at 08:45

## 2019-04-01 RX ADMIN — Medication SCH MG: at 12:12

## 2019-04-01 RX ADMIN — HALOPERIDOL SCH MG: 5 TABLET ORAL at 08:45

## 2019-04-01 NOTE — NUR
SUPPORTIVE COUNSELING: SW spoke with pt regarding her plan for discharge, pt wants to leave 
the hospital as soon as possible. Pts insight into her mental illness is poor. Pt remains 
delusional and paranoid and is restless. Pt informed SW that she has been implanted with 
radiation and that is why she cannot stay still. SW asked pt where she would like to be 
discharge to and pt was unable to provide SW with a safe plan for discharge.

## 2019-04-01 NOTE — NUR
SNF REFERRALS:



SW faxed SNF referrals to Cheryl, admissions coordinator at [facility] Address: [address], [place] [address] Phone: [phone] Fax: 
[phone] and Brielle admissions coordinator at [facility] 
Address: [address] [place] [address] Phone: [phone] Fax: [phone] for 
review.

## 2019-04-02 VITALS — SYSTOLIC BLOOD PRESSURE: 91 MMHG | DIASTOLIC BLOOD PRESSURE: 50 MMHG

## 2019-04-02 VITALS — SYSTOLIC BLOOD PRESSURE: 82 MMHG | DIASTOLIC BLOOD PRESSURE: 43 MMHG

## 2019-04-02 VITALS — SYSTOLIC BLOOD PRESSURE: 100 MMHG | DIASTOLIC BLOOD PRESSURE: 69 MMHG

## 2019-04-02 RX ADMIN — HALOPERIDOL SCH MG: 5 TABLET ORAL at 16:15

## 2019-04-02 RX ADMIN — BENZTROPINE MESYLATE SCH MG: 1 TABLET ORAL at 16:15

## 2019-04-02 RX ADMIN — HALOPERIDOL SCH MG: 5 TABLET ORAL at 12:18

## 2019-04-02 RX ADMIN — BENZTROPINE MESYLATE SCH MG: 1 TABLET ORAL at 09:02

## 2019-04-02 RX ADMIN — BENZTROPINE MESYLATE SCH MG: 1 TABLET ORAL at 12:19

## 2019-04-02 RX ADMIN — Medication SCH MG: at 16:15

## 2019-04-02 RX ADMIN — Medication SCH MG: at 12:18

## 2019-04-02 RX ADMIN — HALOPERIDOL SCH MG: 5 TABLET ORAL at 09:02

## 2019-04-02 RX ADMIN — Medication SCH MG: at 09:02

## 2019-04-02 NOTE — NUR
SW received a call from Danielle, admission coordinator at [facility] 
Address: [address] [place] [address] Phone: [phone] stating pt has been 
accepted to the facility.

## 2019-04-02 NOTE — NUR
GPS NOTE

RECEIVED PATIENT SLEEPINH AND IN ROOM. PATIENT IS DISPLAYING NO S/S OF APPARENT DISTRESS AT 
THIS TIME.  PATIENT BREATHING IS UNLABORED WITH SYMMETRICAL  RISE AND FALL OF THE CHEST. 
PATIENT ASSISTED WITH TURNING AND REPOSITIONING Q2HR AND PRN FOR COMFORT AND CIRCULATION.  
PATIENT HAS NO NEEDS AT THIS TIME. PATIENT BED SIDE RAILS ARE UP X 2 FOR SAFETY, BED IS 
LOCKED AND LOW.  WILL CONTINUE TO MONITOR AND MAINTAIN SAFETY Q15 MIN WITH THE HELP OF 
STAFF.

## 2019-04-02 NOTE — NUR
SNF REFERRAL UPDATE: SW received a call from Brielle admissions coordinator at [facility] Address: [address], [place] [address] Phone: [phone] 
stating pt was not accepted due to not having any SNF MEDICARE days available.

## 2019-04-02 NOTE — NUR
SNF REFERRAL: SW faxed SNF referral to CJ, admissions coordinator at [facility] Address: [address] [place] [address] Phone: [phone] Fax: [phone] for review.

## 2019-04-03 VITALS — SYSTOLIC BLOOD PRESSURE: 133 MMHG | DIASTOLIC BLOOD PRESSURE: 59 MMHG

## 2019-04-03 VITALS — SYSTOLIC BLOOD PRESSURE: 90 MMHG | DIASTOLIC BLOOD PRESSURE: 50 MMHG

## 2019-04-03 VITALS — DIASTOLIC BLOOD PRESSURE: 51 MMHG | SYSTOLIC BLOOD PRESSURE: 84 MMHG

## 2019-04-03 RX ADMIN — BENZTROPINE MESYLATE SCH MG: 1 TABLET ORAL at 12:02

## 2019-04-03 RX ADMIN — BENZTROPINE MESYLATE SCH MG: 1 TABLET ORAL at 16:25

## 2019-04-03 RX ADMIN — SERTRALINE HYDROCHLORIDE SCH MG: 50 TABLET, FILM COATED ORAL at 21:00

## 2019-04-03 RX ADMIN — HALOPERIDOL SCH MG: 5 TABLET ORAL at 08:30

## 2019-04-03 RX ADMIN — HALOPERIDOL SCH MG: 5 TABLET ORAL at 12:02

## 2019-04-03 RX ADMIN — HALOPERIDOL SCH MG: 5 TABLET ORAL at 16:25

## 2019-04-03 RX ADMIN — Medication SCH MG: at 12:02

## 2019-04-03 RX ADMIN — BENZTROPINE MESYLATE SCH MG: 1 TABLET ORAL at 08:30

## 2019-04-03 RX ADMIN — Medication SCH MG: at 08:30

## 2019-04-04 VITALS — DIASTOLIC BLOOD PRESSURE: 55 MMHG | SYSTOLIC BLOOD PRESSURE: 97 MMHG

## 2019-04-04 VITALS — SYSTOLIC BLOOD PRESSURE: 100 MMHG | DIASTOLIC BLOOD PRESSURE: 59 MMHG

## 2019-04-04 VITALS — SYSTOLIC BLOOD PRESSURE: 108 MMHG | DIASTOLIC BLOOD PRESSURE: 65 MMHG

## 2019-04-04 RX ADMIN — SERTRALINE HYDROCHLORIDE SCH MG: 50 TABLET, FILM COATED ORAL at 08:36

## 2019-04-04 RX ADMIN — HALOPERIDOL SCH MG: 5 TABLET ORAL at 12:05

## 2019-04-04 RX ADMIN — BENZTROPINE MESYLATE SCH MG: 1 TABLET ORAL at 08:36

## 2019-04-04 RX ADMIN — BENZTROPINE MESYLATE SCH MG: 1 TABLET ORAL at 12:05

## 2019-04-04 RX ADMIN — HALOPERIDOL SCH MG: 5 TABLET ORAL at 16:58

## 2019-04-04 RX ADMIN — HALOPERIDOL SCH MG: 5 TABLET ORAL at 08:36

## 2019-04-04 RX ADMIN — BENZTROPINE MESYLATE SCH MG: 1 TABLET ORAL at 16:58

## 2019-04-04 NOTE — NUR
Pt has intermittently came to social work and requested to be discharged to the streets. 
 spoke with pt regarding safety and proper discharge. Pt is still under her 14 
day hold and no discharge date has been identified at this time. Social work informed pt 
that she has been accepted to [facility] Address: [address] [place] [address] Phone: [phone]. Pt stated she understood and was willing to go to 
facility. Pt then returned again and stated she wanted to be discharged to the streets, 
social work attempted to re-direct to possible [facility] placement however pt is requesting 
to be released to the [facility]. Social work again educated pt on hold procedure and process 
for MD to indicate discharge date, social work will speak with pt closer to discharge date.

## 2019-04-04 NOTE — NUR
Social Work: Pt returned to speak with . Pt stated that she is suicidal and 
wants to harm herself.  asked pt what had led her to feel suicidal and pt 
responded with I dont know  asked if pt had a plan, pt responded with I 
dont want to hurt anyone I dont want to hurt myself. Pt stated that she thinks it is all 
the medication she has been taking that has been making her think evil things. Social work 
encouraged patient to speak with psychiatrist regarding SI thoughts and review of 
medication. Social work asked pt if she has active SI and HI pt stated no. Social work will 
continue to monitor pt.

## 2019-04-05 VITALS — DIASTOLIC BLOOD PRESSURE: 62 MMHG | SYSTOLIC BLOOD PRESSURE: 104 MMHG

## 2019-04-05 VITALS — SYSTOLIC BLOOD PRESSURE: 99 MMHG | DIASTOLIC BLOOD PRESSURE: 59 MMHG

## 2019-04-05 RX ADMIN — HALOPERIDOL SCH MG: 5 TABLET ORAL at 12:00

## 2019-04-05 RX ADMIN — BENZTROPINE MESYLATE SCH MG: 1 TABLET ORAL at 16:00

## 2019-04-05 RX ADMIN — BENZTROPINE MESYLATE SCH MG: 1 TABLET ORAL at 12:00

## 2019-04-05 RX ADMIN — HALOPERIDOL SCH MG: 5 TABLET ORAL at 16:00

## 2019-04-05 RX ADMIN — BENZTROPINE MESYLATE SCH MG: 1 TABLET ORAL at 08:08

## 2019-04-05 RX ADMIN — HALOPERIDOL SCH MG: 5 TABLET ORAL at 08:07

## 2019-04-05 RX ADMIN — SERTRALINE HYDROCHLORIDE SCH MG: 50 TABLET, FILM COATED ORAL at 08:07

## 2019-04-05 NOTE — NUR
SNF REFERRAL:  faxed referral packet to 



[facility] 

Attention to REGINA Mcnair or Mohit

Address: [address], [place] [address] 

Phone: [phone] 

Fax: [phone]

## 2019-04-05 NOTE — NUR
Social Work: Substance Abuse/Chemical Dependency assessment has been completed and filed in 
pts case since 04/01/19.

## 2019-04-05 NOTE — NUR
SNF REFERRAL:  faxed Snf referral to 



[facility] 

Attention: Brielle

Address: [address], [place] [address] 

Phone: [phone], 

Fax: [phone]

## 2019-04-05 NOTE — NUR
SW received a call from Brielle, admissions coordinator at [facility] 
Address: [address] [facility], [place] [address] Phone: [phone] and fax [phone] 
stating pt was not accepted. Sw will contact pt's physician for further placement options.

## 2019-04-06 VITALS — DIASTOLIC BLOOD PRESSURE: 57 MMHG | SYSTOLIC BLOOD PRESSURE: 94 MMHG

## 2019-04-06 VITALS — DIASTOLIC BLOOD PRESSURE: 95 MMHG | SYSTOLIC BLOOD PRESSURE: 99 MMHG

## 2019-04-06 VITALS — DIASTOLIC BLOOD PRESSURE: 55 MMHG | SYSTOLIC BLOOD PRESSURE: 113 MMHG

## 2019-04-06 VITALS — DIASTOLIC BLOOD PRESSURE: 64 MMHG | SYSTOLIC BLOOD PRESSURE: 118 MMHG

## 2019-04-06 RX ADMIN — BENZTROPINE MESYLATE SCH MG: 1 TABLET ORAL at 17:15

## 2019-04-06 RX ADMIN — BENZTROPINE MESYLATE SCH MG: 1 TABLET ORAL at 12:40

## 2019-04-06 RX ADMIN — SERTRALINE HYDROCHLORIDE SCH MG: 50 TABLET, FILM COATED ORAL at 08:56

## 2019-04-06 RX ADMIN — HALOPERIDOL SCH MG: 5 TABLET ORAL at 08:55

## 2019-04-06 RX ADMIN — HALOPERIDOL SCH MG: 5 TABLET ORAL at 17:15

## 2019-04-06 RX ADMIN — BENZTROPINE MESYLATE SCH MG: 1 TABLET ORAL at 08:56

## 2019-04-06 RX ADMIN — HALOPERIDOL SCH MG: 5 TABLET ORAL at 12:40

## 2019-04-07 VITALS — DIASTOLIC BLOOD PRESSURE: 55 MMHG | SYSTOLIC BLOOD PRESSURE: 90 MMHG

## 2019-04-07 VITALS — SYSTOLIC BLOOD PRESSURE: 120 MMHG | DIASTOLIC BLOOD PRESSURE: 66 MMHG

## 2019-04-07 VITALS — DIASTOLIC BLOOD PRESSURE: 62 MMHG | SYSTOLIC BLOOD PRESSURE: 105 MMHG

## 2019-04-07 VITALS — DIASTOLIC BLOOD PRESSURE: 59 MMHG | SYSTOLIC BLOOD PRESSURE: 113 MMHG

## 2019-04-07 RX ADMIN — HALOPERIDOL SCH MG: 5 TABLET ORAL at 12:03

## 2019-04-07 RX ADMIN — HALOPERIDOL SCH MG: 5 TABLET ORAL at 08:30

## 2019-04-07 RX ADMIN — BENZTROPINE MESYLATE SCH MG: 1 TABLET ORAL at 12:03

## 2019-04-07 RX ADMIN — BENZTROPINE MESYLATE SCH MG: 1 TABLET ORAL at 08:30

## 2019-04-07 RX ADMIN — HALOPERIDOL SCH MG: 5 TABLET ORAL at 16:11

## 2019-04-07 RX ADMIN — SERTRALINE HYDROCHLORIDE SCH MG: 50 TABLET, FILM COATED ORAL at 08:30

## 2019-04-07 RX ADMIN — BENZTROPINE MESYLATE SCH MG: 1 TABLET ORAL at 16:11

## 2019-04-08 VITALS — SYSTOLIC BLOOD PRESSURE: 109 MMHG | DIASTOLIC BLOOD PRESSURE: 55 MMHG

## 2019-04-08 VITALS — DIASTOLIC BLOOD PRESSURE: 56 MMHG | SYSTOLIC BLOOD PRESSURE: 88 MMHG

## 2019-04-08 VITALS — DIASTOLIC BLOOD PRESSURE: 60 MMHG | SYSTOLIC BLOOD PRESSURE: 107 MMHG

## 2019-04-08 RX ADMIN — HALOPERIDOL SCH MG: 5 TABLET ORAL at 12:47

## 2019-04-08 RX ADMIN — HALOPERIDOL SCH MG: 5 TABLET ORAL at 08:46

## 2019-04-08 RX ADMIN — BENZTROPINE MESYLATE SCH MG: 1 TABLET ORAL at 16:07

## 2019-04-08 RX ADMIN — BENZTROPINE MESYLATE SCH MG: 1 TABLET ORAL at 08:46

## 2019-04-08 RX ADMIN — ACETAMINOPHEN PRN MG: 325 TABLET ORAL at 21:26

## 2019-04-08 RX ADMIN — SERTRALINE HYDROCHLORIDE SCH MG: 50 TABLET, FILM COATED ORAL at 08:46

## 2019-04-08 RX ADMIN — HALOPERIDOL SCH MG: 5 TABLET ORAL at 16:07

## 2019-04-08 RX ADMIN — BENZTROPINE MESYLATE SCH MG: 1 TABLET ORAL at 12:47

## 2019-04-09 VITALS — DIASTOLIC BLOOD PRESSURE: 60 MMHG | SYSTOLIC BLOOD PRESSURE: 106 MMHG

## 2019-04-09 RX ADMIN — BENZTROPINE MESYLATE SCH MG: 1 TABLET ORAL at 08:27

## 2019-04-09 RX ADMIN — SERTRALINE HYDROCHLORIDE SCH MG: 50 TABLET, FILM COATED ORAL at 08:27

## 2019-04-09 RX ADMIN — HALOPERIDOL SCH MG: 5 TABLET ORAL at 08:27

## 2019-04-09 NOTE — NUR
GPS DISCHARGE NOTE:

PT DISCHARGE TO [address] [address]. PT IN 
STABLE CONDITION , VSS , NO S/S DISTRESS NOTED. PT AMBULATORY , SELF CARE, DENIES SI/HI, 
DENIES C/O PAIN OR DISCOMFORT. EXIT CARE DONE PRINTED, SIGN, AND GIVEN TO PT. ALL BELONGINGS 
RETURNED TO PT, PT REFUSED SKIN ASSESSMENT , ALL BELONGINGS RETURNED.

## 2019-04-09 NOTE — NUR
DISCHARGE NOTE: Pt will be discharged at 12:30pm via [facility] ambulance trip #[phone] to 
[facility] Address: [address] [place] [address] Phone: [phone]. No family to notify. Pts mood was euthymic with congruent affect. Pt denied 
visual/auditory hallucinations and denied suicidal/homicidal ideation. Patient will address 
her substance use and continue psychiatric treatment with Psychiatrist:  Dr. Neris Mason 
[address], [place] [address] [phone] and be under the medical 
care of Internist: Dr. Guillaume Calderon [address], [place] [address] [phone]. The multidisciplinary exitcare form was done, printed, signed, and given 
to the patient.

## 2022-06-21 ENCOUNTER — HOSPITAL ENCOUNTER (EMERGENCY)
Dept: HOSPITAL 87 - ER | Age: 62
Discharge: HOME | End: 2022-06-21
Payer: MEDICAID

## 2022-06-21 VITALS — HEIGHT: 65 IN | WEIGHT: 130.07 LBS | BODY MASS INDEX: 21.67 KG/M2

## 2022-06-21 VITALS — SYSTOLIC BLOOD PRESSURE: 125 MMHG | DIASTOLIC BLOOD PRESSURE: 71 MMHG

## 2022-06-21 DIAGNOSIS — F20.9: Primary | ICD-10-CM

## 2022-06-21 LAB
AMPHETAMINES UR QL SCN: NEGATIVE
BARBITURATES UR QL SCN: NEGATIVE
BENZODIAZ UR QL SCN: NEGATIVE
BZE UR QL SCN: NEGATIVE
CANNABINOIDS UR QL SCN: NEGATIVE
METHADONE UR QL SCN: NEGATIVE
OPIATES UR QL SCN: NEGATIVE
PCP UR QL SCN: NEGATIVE

## 2022-06-21 PROCEDURE — 99283 EMERGENCY DEPT VISIT LOW MDM: CPT

## 2022-06-21 PROCEDURE — 80305 DRUG TEST PRSMV DIR OPT OBS: CPT
